# Patient Record
Sex: FEMALE | Race: WHITE
[De-identification: names, ages, dates, MRNs, and addresses within clinical notes are randomized per-mention and may not be internally consistent; named-entity substitution may affect disease eponyms.]

---

## 2019-08-07 ENCOUNTER — HOSPITAL ENCOUNTER (EMERGENCY)
Dept: HOSPITAL 61 - ER | Age: 49
Discharge: HOME | End: 2019-08-07
Payer: MEDICARE

## 2019-08-07 VITALS
DIASTOLIC BLOOD PRESSURE: 78 MMHG | SYSTOLIC BLOOD PRESSURE: 147 MMHG | SYSTOLIC BLOOD PRESSURE: 147 MMHG | DIASTOLIC BLOOD PRESSURE: 78 MMHG

## 2019-08-07 VITALS — HEIGHT: 64 IN | WEIGHT: 205 LBS | BODY MASS INDEX: 35 KG/M2

## 2019-08-07 DIAGNOSIS — I11.9: ICD-10-CM

## 2019-08-07 DIAGNOSIS — E11.9: ICD-10-CM

## 2019-08-07 DIAGNOSIS — S90.122A: Primary | ICD-10-CM
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
## 2019-08-07 DIAGNOSIS — F41.9: ICD-10-CM
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
## 2019-08-07 DIAGNOSIS — E05.90: ICD-10-CM

## 2019-08-07 DIAGNOSIS — E78.00: ICD-10-CM

## 2019-08-07 DIAGNOSIS — F32.9: ICD-10-CM
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
## 2019-08-07 DIAGNOSIS — K21.9: ICD-10-CM

## 2019-08-07 PROCEDURE — 73660 X-RAY EXAM OF TOE(S): CPT

## 2019-08-07 PROCEDURE — 99284 EMERGENCY DEPT VISIT MOD MDM: CPT

## 2019-08-07 NOTE — PHYS DOC
Past Medical History


Past Medical History:  Anemia, Anxiety, Depression, Diabetes-Type II, GERD, High

Cholesterol, Heart Disease, Hypertension, Hyperthyroid, Other


Additional Past Medical Histor:  kidney failure


Past Surgical History:  Other


Alcohol Use:  None


Drug Use:  None





Adult General


Chief Complaint


Chief Complaint:  TOE PROBLEM





HPI


HPI





Patient is a 49  year old [f__sex] who presents with []





Review of Systems


Review of Systems





Constitutional: Denies fever or chills []


Eyes: Denies change in visual acuity, redness, or eye pain []


HENT: Denies nasal congestion or sore throat []


Respiratory: Denies cough or shortness of breath []


Cardiovascular: No additional information not addressed in HPI []


GI: Denies abdominal pain, nausea, vomiting, bloody stools or diarrhea []


: Denies dysuria or hematuria []


Musculoskeletal: Denies back pain or joint pain []


Integument: Denies rash or skin lesions []


Neurologic: Denies headache, focal weakness or sensory changes []


Endocrine: Denies polyuria or polydipsia []





All other systems were reviewed and found to be within normal limits, except as 

documented in this note.





Allergies


Allergies





Allergies








Coded Allergies Type Severity Reaction Last Updated Verified


 


  No Known Drug Allergies    3/12/14 No











Physical Exam


Physical Exam





Constitutional: Well developed, well nourished, no acute distress, non-toxic 

appearance. []


HENT: Normocephalic, atraumatic, bilateral external ears normal, oropharynx 

moist, no oral exudates, nose normal. []


Eyes: PERRLA, EOMI, conjunctiva normal, no discharge. [] 


Neck: Normal range of motion, no tenderness, supple, no stridor. [] 


Cardiovascular:Heart rate regular rhythm, no murmur []


Lungs & Thorax:  Bilateral breath sounds clear to auscultation []


Abdomen: Bowel sounds normal, soft, no tenderness, no masses, no pulsatile 

masses. [] 


Skin: Warm, dry, no erythema, no rash. [] 


Back: No tenderness, no CVA tenderness. [] 


Extremities: No tenderness, no cyanosis, no clubbing, ROM intact, no edema. [] 


Neurologic: Alert and oriented X 3, normal motor function, normal sensory 

function, no focal deficits noted. []


Psychologic: Affect normal, judgement normal, mood normal. []





Current Patient Data


Vital Signs





                                   Vital Signs








  Date Time  Temp Pulse Resp B/P (MAP) Pulse Ox O2 Delivery O2 Flow Rate FiO2


 


8/7/19 05:00 97.9 88 22 147/78 (101) 96 Room Air  





 97.9       











EKG


EKG


[]





Radiology/Procedures


Radiology/Procedures


[]





Course & Med Decision Making


Course & Med Decision Making


Pertinent Labs and Imaging studies reviewed. (See chart for details)





[]





Dragon Disclaimer


Dragon Disclaimer


This electronic medical record was generated, in whole or in part, using a voice

 recognition dictation system.





Departure


Departure


Impression:  


   Primary Impression:  


   Contusion of toe of left foot


Disposition:  01 HOME, SELF-CARE


Condition:  STABLE


Referrals:  


NO PCP (PCP)








KIEL MESSINA DPM


Patient Instructions:  Cast Shoe, Crush Injury, Fingers or Toes, Easy-to-Read, 

Turf Toe, Wound Care, Easy-to-Read


Scripts


Oxycodone HCl/Acetaminophen (Percocet 5-325 mg Tablet) 1 Each Tablet


0.5 EACH PO Q6HRS PRN for PAIN, #10 TAB


   Prov: STANTON CREWS DO         8/7/19





Problem Qualifiers








   Primary Impression:  


   Contusion of toe of left foot


   Encounter type:  initial encounter  Toe:  lesser toe  Damage to nail status: 

    without damage  Qualified Codes:  S90.122A - Contusion of left lesser toe(s)

    without damage to nail, initial encounter








STANTON CREWS DO              Aug 7, 2019 06:15

## 2019-08-07 NOTE — RAD
TOES LEFT

 

Clinical Indication: Pain and bruising the third toe.

 

Comparison: None.

 

Findings: 

There are hammertoe deformities. There are extensive arterial 

calcifications, greater than expected for patient age. Correlate for 

vasculopathy. There are dorsal osteophytes of the midfoot. No dorsal soft 

tissue swelling. Joint spaces relatively maintained. No acute fracture is 

identified. There is deformity of the mid proximal phalanx of the third 

toe which could be due to old fracture.

 

IMPRESSION:

No acute fracture.

 

Electronically signed by: Marciano Rodríguez MD (8/7/2019 7:37 AM) [id number]

## 2019-09-11 ENCOUNTER — HOSPITAL ENCOUNTER (OUTPATIENT)
Dept: HOSPITAL 61 - NM | Age: 49
Discharge: HOME | End: 2019-09-11
Attending: INTERNAL MEDICINE
Payer: MEDICARE

## 2019-09-11 DIAGNOSIS — F17.200: ICD-10-CM

## 2019-09-11 DIAGNOSIS — I08.3: Primary | ICD-10-CM

## 2019-09-11 DIAGNOSIS — E11.9: ICD-10-CM

## 2019-09-11 DIAGNOSIS — I11.9: ICD-10-CM

## 2019-09-11 PROCEDURE — 93306 TTE W/DOPPLER COMPLETE: CPT

## 2019-09-11 PROCEDURE — 93017 CV STRESS TEST TRACING ONLY: CPT

## 2019-09-11 PROCEDURE — A9500 TC99M SESTAMIBI: HCPCS

## 2019-09-11 PROCEDURE — 78452 HT MUSCLE IMAGE SPECT MULT: CPT

## 2019-09-11 NOTE — RAD
MR#: [id number]

Account#: [id number]

Accession#: [id number]

Date of Study: 09/11/2019

Ordering Physician: DEV LONDON, 

Referring Physician: ANGELICA ALVAREZ Tech: CHARLENE Ch, ARRT (R) (N)





--------------- APPROVED REPORT --------------





Test Type:          Pharmacological

Stress Nurse/Tech: Renee Aly R.N.

Test Indications: exertional dyspnea

Cardiac History: Hypertension, Diabetes, smoker

Medications:     See Electronic Medical Record

Medical History: See Electronic Medical Record

Resting ECG:     NSR

Resting Heart Rate: 92 bpm

Resting Blood Pressure: 176/85mmHg

Pretest Chest Pain: No chest pain



Nurse/Tech Notes

S1S2, lungs sound clear

Consent: The procedure was explained to the patient in lay terms. Informed consent was witnessed. Kristopher
eout was entered into [employer]. History and Stress Test performed by Renee Aly R.N.



Pharm. Details

Pharmacologic stress testing was performed using 0.4mg per 5ml of regadenoson given intravenously ove
r 7-10 seconds.



Stress Symptoms

Nausea



POST EXERCISE

Reason for Termination: Infusion complete

Target HR: 147

Max HR: 95 bpm

Max Blood Pressure: 169/83mmHg

Blood Pressure response to exercise: Normal blood pressure response during stress.

Chest Pain: No. 

Arrhythmia: No. 

ST Change: No. 



INTERPRETATION

Stress EKG Conclusion: The baseline EKG shows a sinus rhythm, poor R wave progression and nonspecific
 ST segment changes.

The stress EKG shows no significant changes from baseline.

No EKG evidence of stress-induced ischemia.



Imaging Protocol

IMAGE PROTOCOL: Rest Tc-99m/stress Tc-99m 1 day



Rest:            Stress:         Viability:   

Radiopharm.Tc99m LqzieeqkiYi84l Sestamibi

Uirb30lFy            32mCi            

Img Date  09/11/2019 09/11/2019      

Inj-Img Amqf91gra.           60min.           



Rest Admin Site:IV - Right HandAdministrator:RT Scott (R)(N)

Stress Admin Site: IV - Right HandAdministrator: RT Scott (R)(N)



STRESS DATA

End Diast. Vol.151.0mlAv. Heart Rate88.0bpm

End Syst. Vol.74.0mlCO Index BSA0.0L/min

Myocardial Javs483.0gEject. Wwrzfxwe08.0%



Stress Rates

Pk. Fill Rate2.99EDV/secLVtime Pk. Fill 130.26msec

Pk. Empty Rate3.24ESV/secLVtime Pk. Ugwhs602.53msec

1/3 Pk. Fill0.88EDV/sec



Stress Scores

Regional WT1.00Summed WT14.00

Regional WM0.00Summed WM12.00



LV Perfusion

The stress scans showed no significant defects.

The rest scans showed no significant defects.

Nuclear imaging shows no reversible ischemia or infarct.



Wall Motion

Left ventricular systolic function is intact with an ejection fraction of 59% and a TID of 1.07.



LV Perf. Quant

17 Seg. SSS0.00

17 Seg. SRS1.00

17 Seg. SDS0.00

Stress Defect Extent (% LAD)0.00Rest Defect Extent (% LAD)6.90Rev. Defect Extent (% LAD)0.00

Stress Defect Extent (% LCX) 0.00Rest Defect Extent (% LCX)0.00Rev. Defect Extent (% LCX)0.00

Stress Defect Extent (% RCA)0.00Rest Defect Extent (% RCA)0.00Rev. Defect Extent (% RCA)0.00

Stress Defect Extent (% ROHAN)0.00Rest Defect Extent (% ROHAN)3.00Rev. Defect Extent (% ROHAN)0.00



IMPRESSION

Comparison of Rest to Stress Regional Wall Thickening:  No Change, Normal



Conclusion

1. Abnormal baseline EKG but no EKG evidence of stress-induced ischemia.

2. Nuclear imaging shows no reversible ischemia or infarct.

3. Intact LV systolic function with an ejection fraction of 59%.

4. Low to moderately low risk Lexiscan nuclear stress test.



Signed by : Justin Tinoco MD

Electronically Approved : 09/11/2019 12:43:48

## 2019-09-11 NOTE — CARD
MR#: [id number]

Account#: [id number]

Accession#: [id number]

Date of Study: 09/11/2019

Ordering Physician: DEV LONDON, 

Referring Physician: DEV LONDON, 

Tech: Jade Corley [facility]





--------------- APPROVED REPORT --------------





EXAM: Two-dimensional and M-mode echocardiogram with Doppler and color Doppler.



Other Information 

Quality : AverageHR: 86bpm

Rhythm : NSR



INDICATION

Dyspnea 



2D DIMENSIONS 

RVDd3.5 (2.9-3.5cm)Left Atrium(2D)4.8 (1.6-4.0cm)

IVSd1.3 (0.7-1.1cm)Aortic Root(2D)2.8 (2.0-3.7cm)

LVDd5.2 (3.9-5.9cm)LVOT Diameter1.9 (1.8-2.4cm)

PWd1.3 (0.7-1.1cm)LVDs4.2 (2.5-4.0cm)

FS (%) 19.3 %SV52.1 ml



M-Mode DIMENSIONS 

Left Atrium(MM)4.20 (2.5-4.0cm)Aortic Root2.95 (2.2-3.7cm)



Aortic Valve

AoV Peak Vasu.121.7cm/sAoV VTI23.6cm

AO Peak GR.5.9mmHgLVOT Peak Vasu.61.0cm/s

AO Mean GR.3mmHgAVA (VMAX)1.37cm2

ARNEL   (VTI)1.60cm2



Mitral Valve

MV E Ldybdtdd504.4cm/sMV E Peak Gr.7mmHg

MV DECEL FCHC952zhGM A [place]40.9cm/s

MV E Mean Gr.3mmHgE/A  Ratio3.0



Pulmonary Valve

PV Peak Dseokbco34.3cm/s



Tricuspid Valve

TR P. Ycwrvtjf052gp/sRAP MVVOTBXP1uzVc

TR Peak Gr.31xxKbSGDH65gbWy



Pulmonary Vein

S1 Gtyofbox47.2cm/sD2 Vhmrkxye14.1cm/s



 LEFT VENTRICLE 

The left ventricle is normal size. There is mild concentric left ventricular hypertrophy. The ejectio
n fraction is moderately impaired. The Ejection Fraction is estimated at 35%. There is severe hypokin
esis of the septal wall and mild hypokinesis in the remaining walls. Transmitral Doppler flow pattern
 is abnormal.



 RIGHT VENTRICLE 

The right ventricle is normal size. There is normal right ventricular wall thickness. The right ventr
icular systolic function is normal.



 ATRIA 

The left atrium is mildly dilated. The right atrium size is normal. The interatrial septum is intact 
with no evidence for an atrial septal defect or patent foramen ovale as noted on 2-D or Doppler imagi
ng.



 AORTIC VALVE 

The aortic valve is mildly calcified. The aortic valve is trileaflet. Doppler and Color Flow revealed
 trace aortic regurgitation. There is no significant aortic valvular stenosis. There is no aortic corey
vular vegetation.



 MITRAL VALVE 

Mitral annular calcification is mild to moderate. There is no evidence of mitral valve prolapse. Ther
e is no mitral valve stenosis. Doppler and Color-flow revealed mild mitral regurgitation.



 TRICUSPID VALVE 

The tricuspid valve is normal in structure and function. Doppler and Color Flow revealed mild tricusp
id regurgitation. The PA pressure was estimated at 50 mmHg. There is no tricuspid valve prolapse or v
egetation. There is no tricuspid valve stenosis.



 PULMONIC VALVE 

The pulmonary valve is normal in structure and function. Doppler and Color Flow revealed trace pulmon
ic valvular regurgitation. There is no pulmonic valvular stenosis.



 GREAT VESSELS 

The aortic root is normal in size. The ascending aorta is normal in size. The IVC is normal in size a
nd collapses >50% with inspiration.



 PERICARDIAL EFFUSION 

There is no evidence of significant pericardial effusion.



Critical Notification

Critical Value: No



<Conclusion>

The left ventricle is normal size.

The ejection fraction is moderately impaired.

The Ejection Fraction is estimated at 35%.

There is severe hypokinesis of the septal wall and mild hypokinesis in the remaining walls.

There is mild concentric left ventricular hypertrophy.

There is no significant aortic valvular stenosis.

Doppler and Color Flow revealed trace aortic regurgitation.

Doppler and Color-flow revealed mild mitral regurgitation.

Doppler and Color Flow revealed mild tricuspid regurgitation.

The PA pressure was estimated at 50 mmHg.



Signed by : Justin Tinoco MD

Electronically Approved : 09/11/2019 09:46:44

## 2019-09-24 ENCOUNTER — HOSPITAL ENCOUNTER (OUTPATIENT)
Dept: HOSPITAL 61 - CCL | Age: 49
Discharge: HOME | End: 2019-09-24
Attending: INTERNAL MEDICINE
Payer: MEDICARE

## 2019-09-24 VITALS — WEIGHT: 211 LBS | HEIGHT: 64 IN | BODY MASS INDEX: 36.02 KG/M2

## 2019-09-24 VITALS — SYSTOLIC BLOOD PRESSURE: 154 MMHG | DIASTOLIC BLOOD PRESSURE: 81 MMHG

## 2019-09-24 VITALS — DIASTOLIC BLOOD PRESSURE: 84 MMHG | SYSTOLIC BLOOD PRESSURE: 158 MMHG

## 2019-09-24 VITALS — DIASTOLIC BLOOD PRESSURE: 85 MMHG | SYSTOLIC BLOOD PRESSURE: 175 MMHG

## 2019-09-24 VITALS — DIASTOLIC BLOOD PRESSURE: 70 MMHG | SYSTOLIC BLOOD PRESSURE: 153 MMHG

## 2019-09-24 VITALS — SYSTOLIC BLOOD PRESSURE: 140 MMHG | DIASTOLIC BLOOD PRESSURE: 65 MMHG

## 2019-09-24 VITALS — SYSTOLIC BLOOD PRESSURE: 149 MMHG | DIASTOLIC BLOOD PRESSURE: 83 MMHG

## 2019-09-24 VITALS — SYSTOLIC BLOOD PRESSURE: 144 MMHG | DIASTOLIC BLOOD PRESSURE: 65 MMHG

## 2019-09-24 VITALS — SYSTOLIC BLOOD PRESSURE: 163 MMHG | DIASTOLIC BLOOD PRESSURE: 77 MMHG

## 2019-09-24 VITALS — SYSTOLIC BLOOD PRESSURE: 150 MMHG | DIASTOLIC BLOOD PRESSURE: 80 MMHG

## 2019-09-24 VITALS — DIASTOLIC BLOOD PRESSURE: 83 MMHG | SYSTOLIC BLOOD PRESSURE: 158 MMHG

## 2019-09-24 VITALS — SYSTOLIC BLOOD PRESSURE: 148 MMHG | DIASTOLIC BLOOD PRESSURE: 68 MMHG

## 2019-09-24 VITALS — DIASTOLIC BLOOD PRESSURE: 82 MMHG | SYSTOLIC BLOOD PRESSURE: 158 MMHG

## 2019-09-24 DIAGNOSIS — E11.22: ICD-10-CM

## 2019-09-24 DIAGNOSIS — E03.9: ICD-10-CM

## 2019-09-24 DIAGNOSIS — I12.0: ICD-10-CM

## 2019-09-24 DIAGNOSIS — N18.6: ICD-10-CM

## 2019-09-24 DIAGNOSIS — Z79.84: ICD-10-CM

## 2019-09-24 DIAGNOSIS — Z87.891: ICD-10-CM

## 2019-09-24 DIAGNOSIS — I70.222: Primary | ICD-10-CM

## 2019-09-24 LAB
ANION GAP SERPL CALC-SCNC: 17 MMOL/L (ref 6–14)
BUN SERPL-MCNC: 60 MG/DL (ref 7–20)
CALCIUM SERPL-MCNC: 8.6 MG/DL (ref 8.5–10.1)
CHLORIDE SERPL-SCNC: 94 MMOL/L (ref 98–107)
CHOLEST SERPL-MCNC: 191 MG/DL (ref 0–200)
CHOLEST/HDLC SERPL: 3.7 {RATIO}
CO2 SERPL-SCNC: 23 MMOL/L (ref 21–32)
CREAT SERPL-MCNC: 8.5 MG/DL (ref 0.6–1)
ERYTHROCYTE [DISTWIDTH] IN BLOOD BY AUTOMATED COUNT: 16.1 % (ref 11.5–14.5)
GFR SERPLBLD BASED ON 1.73 SQ M-ARVRAT: 5 ML/MIN
GLUCOSE SERPL-MCNC: 278 MG/DL (ref 70–99)
HCT VFR BLD CALC: 44.4 % (ref 36–47)
HDLC SERPL-MCNC: 51 MG/DL (ref 40–60)
HGB BLD-MCNC: 14.8 G/DL (ref 12–15.5)
LDLC: 117 MG/DL (ref 0–100)
MCH RBC QN AUTO: 28 PG (ref 25–35)
MCHC RBC AUTO-ENTMCNC: 33 G/DL (ref 31–37)
MCV RBC AUTO: 85 FL (ref 79–100)
PLATELET # BLD AUTO: 178 X10^3/UL (ref 140–400)
POTASSIUM SERPL-SCNC: 4.3 MMOL/L (ref 3.5–5.1)
PROTHROMBIN TIME: 13.7 SEC (ref 11.7–14)
RBC # BLD AUTO: 5.2 X10^6/UL (ref 3.5–5.4)
SODIUM SERPL-SCNC: 134 MMOL/L (ref 136–145)
TRIGL SERPL-MCNC: 113 MG/DL (ref 0–150)
VLDLC: 23 MG/DL (ref 0–40)
WBC # BLD AUTO: 5.4 X10^3/UL (ref 4–11)

## 2019-09-24 PROCEDURE — C1769 GUIDE WIRE: HCPCS

## 2019-09-24 PROCEDURE — G0269 OCCLUSIVE DEVICE IN VEIN ART: HCPCS

## 2019-09-24 PROCEDURE — 37232: CPT

## 2019-09-24 PROCEDURE — 85027 COMPLETE CBC AUTOMATED: CPT

## 2019-09-24 PROCEDURE — C1892 INTRO/SHEATH,FIXED,PEEL-AWAY: HCPCS

## 2019-09-24 PROCEDURE — 80061 LIPID PANEL: CPT

## 2019-09-24 PROCEDURE — 80048 BASIC METABOLIC PNL TOTAL CA: CPT

## 2019-09-24 PROCEDURE — 85610 PROTHROMBIN TIME: CPT

## 2019-09-24 PROCEDURE — C1771 REP DEV, URINARY, W/SLING: HCPCS

## 2019-09-24 PROCEDURE — 75716 ARTERY X-RAYS ARMS/LEGS: CPT

## 2019-09-24 PROCEDURE — 75625 CONTRAST EXAM ABDOMINL AORTA: CPT

## 2019-09-24 PROCEDURE — 85347 COAGULATION TIME ACTIVATED: CPT

## 2019-09-24 PROCEDURE — 37229: CPT

## 2019-09-24 PROCEDURE — C1725 CATH, TRANSLUMIN NON-LASER: HCPCS

## 2019-09-24 PROCEDURE — 99153 MOD SED SAME PHYS/QHP EA: CPT

## 2019-09-24 PROCEDURE — C1724 CATH, TRANS ATHEREC,ROTATION: HCPCS

## 2019-09-24 PROCEDURE — 36415 COLL VENOUS BLD VENIPUNCTURE: CPT

## 2019-09-24 PROCEDURE — 99152 MOD SED SAME PHYS/QHP 5/>YRS: CPT

## 2019-09-24 NOTE — NUR
pt ambulated and tolerated PO. Groin site soft. Discharge teaching done at this time. New 
medications reviewed with pt. PIV dc'd

## 2019-09-24 NOTE — CARD
MR#: [id number]

Account#: [id number]

Accession#: [id number]

Date of Study: 09/24/2019

Ordering Physician: DEV HUFF, 

Referring Physician: DEV HUFF, 

Tech: Michaela Bergeron





--------------- APPROVED REPORT --------------





Patient StatusCLI

Cardiac Technologist:      Michaela Bergeron

Procedure(s) performed: fl time: 15.6 mins

dose: 77 gy/cm2

contrast: 92 ml 

moderate sedation: 81 mins

Atherectomy and PVI of the PTA

PVI of the ARIEL

Abdominal aortogram with bilateral femoral run-off



HISTORY

The patient is a 49 year-old female with a history of : diabetes mellitus with treatment, tobacco his
tory() , hypertension, dyslipidemia.



INDICATION FOR PROCEDURE

The indication(s) include : Rest pain: Left lower extremity.



PROCEDURE NARRATIVE

After appropriate informed consent, the patient was brought to the cath lab and placed in the supine 
position. Preprocedural timeout was completed and confirmed the right patient and procedure. The bila
teral groins were prepped and draped in usual sterile fashion. Moderate sedation acheived with Fentan
yl and Versed. The patient received 2000 units of Heparin for anticoagulation. 



Access: Under lidocaine local anesthesia, a 5Fr introducer sheath was placed in the RCFA via the devon
fied seldinger technique with a J-tipped guidewire and an 18g needle. Diagnostic angiography was then
 performed using a 5Fr Omniflush catheter with digital subtraction angiography. Next, the contralater
al (LCIA) was accessed with the aid of the Omniflush catheter and a J-tipped guidewire. The omniflush
 was then exchanged for a long angled glide catheter which was then placed in the LCFA. Repeat left l
ower extremity with DSA was performed. Subsequently, the glidecatheter was used to do a pullback yaw
uver on the LSFA stenosis. No significant stenosis was identified at the levels of the CFA and SFA on
 the left side. 



FINDINGS: 

AO: 154/86



AORTA: No significant disease. 

RENAL arteries: Patent left renal artery. 

RCIA: No significant disease. 

REIA: No significant disease. 

RIIA: No significant disease. 

RCFA: No significant disease. 

RSFA: Distal SFA with moderate disease of approximately 50%. . 

RPOP: No significant disease. 

RAT: No significant disease. 

RTP trunk: No significant disease. 



LCIA: No signficant disease. 

KJ: No significant disease. 

LIIA: No significant disease. 

LCFA: No significant disease. 

LSFA: There is a focal heavily calcified non-obstructive mid SFA stenosis, likely adventitial calcifi
cation. 

LPOP: No significant disease. 

LAT: There is a distal focal 80% stenosis. 

LTP trunk: No significant disease. 

LPT: Severe diffuse 90% stenosis. 



INTERVENTIONAL TECHINQUE: 

Based on the present symptoms of a nonhealing ulcer in the left third toe a decision was made to perf
orm intervention. The right-sided 5 Angolan sheath was exchanged for a 6 Angolan destination sheath ove
r a Glidewire advantage. Next, with the aid of a glide catheter a Viper wire was placed in the distal
 posterior tibial artery after adequate heparinization with an ACT greater than 300. Next, orbital at
herectomy was performed with a CSI 1.25 mm bur at low and medium speeds. Intra-arterial nitroglycerin
 was administered and the posterior tibial artery was angioplastied with a 2.0 x 120 mm balloon. Next
, attention was then directed to the anterior tibial artery. The Viper wire was changed to a command 
wire and the distal anterior tibial artery lesion at the level of the ankle was angioplastied with a 
2.0 x 12 mm balloon at nominal pressures. Final post-PVI angiography demonstrated 0% residual stenosi
s in the posterior tibial and anterior tibial vessels. No acute competitions were noted. The right-si
ded sheath was then removed after right lower extremity digital subtraction angiography and runoff an
d a Angio-Seal device was placed for hemostasis. The patient received aspirin and ticagrelor at case 
completion.





Conclusion

1. Iliana category 5 claudication and ischemic lower extent B arterial disease

2. Moderate right sided SFA disease

3. Severe left posterior tibial and distal anterior tibial disease 

4. Successful orbital atherectomy and PVI of the left posterior tibial artery with a 2.0 x 120 mm bal
loon

5. Successful PVI of the distal ventricular artery with a 2.0 x 12 mm balloon.



Recommendations

ASA 81mg daily

Ticagrelor 90mg bid x 30 days. 

High dose statin therapy

Risk factor modification

Follow up with podiatry



Signed by : Dev Huff, 

Electronically Approved : 09/24/2019 11:22:16